# Patient Record
Sex: FEMALE | Race: OTHER | HISPANIC OR LATINO
[De-identification: names, ages, dates, MRNs, and addresses within clinical notes are randomized per-mention and may not be internally consistent; named-entity substitution may affect disease eponyms.]

---

## 2018-06-27 ENCOUNTER — ASOB RESULT (OUTPATIENT)
Age: 38
End: 2018-06-27

## 2018-06-27 ENCOUNTER — APPOINTMENT (OUTPATIENT)
Dept: ANTEPARTUM | Facility: CLINIC | Age: 38
End: 2018-06-27
Payer: COMMERCIAL

## 2018-06-27 PROBLEM — Z00.00 ENCOUNTER FOR PREVENTIVE HEALTH EXAMINATION: Status: ACTIVE | Noted: 2018-06-27

## 2018-06-27 PROCEDURE — 76816 OB US FOLLOW-UP PER FETUS: CPT

## 2018-07-17 ENCOUNTER — APPOINTMENT (OUTPATIENT)
Dept: MATERNAL FETAL MEDICINE | Facility: CLINIC | Age: 38
End: 2018-07-17
Payer: COMMERCIAL

## 2018-07-17 ENCOUNTER — ASOB RESULT (OUTPATIENT)
Age: 38
End: 2018-07-17

## 2018-07-17 ENCOUNTER — APPOINTMENT (OUTPATIENT)
Dept: ANTEPARTUM | Facility: CLINIC | Age: 38
End: 2018-07-17

## 2018-07-17 DIAGNOSIS — O35.1XX0 MATERNAL CARE FOR (SUSPECTED) CHROMOSOMAL ABNORMALITY IN FETUS, NOT APPLICABLE OR UNSPECIFIED: ICD-10-CM

## 2018-07-17 DIAGNOSIS — O09.529 SUPERVISION OF ELDERLY MULTIGRAVIDA, UNSPECIFIED TRIMESTER: ICD-10-CM

## 2018-07-17 PROCEDURE — 99241 OFFICE CONSULTATION NEW/ESTAB PATIENT 15 MIN: CPT | Mod: 25

## 2018-07-19 ENCOUNTER — EMERGENCY (EMERGENCY)
Facility: HOSPITAL | Age: 38
LOS: 1 days | Discharge: HOME | End: 2018-07-19
Attending: EMERGENCY MEDICINE
Payer: COMMERCIAL

## 2018-07-19 VITALS
OXYGEN SATURATION: 98 % | SYSTOLIC BLOOD PRESSURE: 115 MMHG | RESPIRATION RATE: 16 BRPM | TEMPERATURE: 98 F | HEART RATE: 80 BPM | DIASTOLIC BLOOD PRESSURE: 72 MMHG

## 2018-07-19 VITALS — WEIGHT: 149.03 LBS

## 2018-07-19 LAB
APPEARANCE UR: CLEAR — SIGNIFICANT CHANGE UP
BACTERIA # UR AUTO: ABNORMAL
BILIRUB UR-MCNC: NEGATIVE — SIGNIFICANT CHANGE UP
COLOR SPEC: YELLOW — SIGNIFICANT CHANGE UP
DIFF PNL FLD: NEGATIVE — SIGNIFICANT CHANGE UP
EPI CELLS # UR: SIGNIFICANT CHANGE UP
GLUCOSE UR QL: 50 MG/DL
HCG UR QL: POSITIVE
KETONES UR-MCNC: ABNORMAL
LEUKOCYTE ESTERASE UR-ACNC: NEGATIVE — SIGNIFICANT CHANGE UP
NITRITE UR-MCNC: NEGATIVE — SIGNIFICANT CHANGE UP
PH UR: 6 — SIGNIFICANT CHANGE UP (ref 5–8)
PROT UR-MCNC: 30 MG/DL
RBC CASTS # UR COMP ASSIST: SIGNIFICANT CHANGE UP /HPF (ref 0–4)
SP GR SPEC: 1.02 — SIGNIFICANT CHANGE UP (ref 1.01–1.02)
UROBILINOGEN FLD QL: 1 MG/DL
WBC UR QL: SIGNIFICANT CHANGE UP

## 2018-07-19 PROCEDURE — 99284 EMERGENCY DEPT VISIT MOD MDM: CPT

## 2018-07-19 PROCEDURE — 76815 OB US LIMITED FETUS(S): CPT | Mod: 26

## 2018-07-19 PROCEDURE — T1013: CPT

## 2018-07-19 PROCEDURE — 76810 OB US >/= 14 WKS ADDL FETUS: CPT

## 2018-07-19 PROCEDURE — 81025 URINE PREGNANCY TEST: CPT

## 2018-07-19 PROCEDURE — 81001 URINALYSIS AUTO W/SCOPE: CPT

## 2018-07-19 NOTE — ED PROVIDER NOTE - CARE PLAN
Principal Discharge DX:	Abdominal pain during intrauterine pregnancy  Secondary Diagnosis:	IUP (intrauterine pregnancy), incidental

## 2018-07-19 NOTE — ED PROVIDER NOTE - OBJECTIVE STATEMENT
18 week pregnant female pt c/o RLQ pain x 1 day, currently resolved. Is concerned as she has h/o symptomless miscarriage in past. Denies vaginal bleeding or discharge, loss of fluids, cramping. States she is feeling the baby move "lightly". Denies nausea/vomiting. Pt presented to urgent care and was sent here for sonogram. Pt denies current pain or tenderness.

## 2018-07-19 NOTE — ED PROVIDER NOTE - MEDICAL DECISION MAKING DETAILS
bedside ultrasound shows good fetal movement and fetal heart beat. Will obtain official transvaginal ultrasound and UA and reassess.

## 2018-07-19 NOTE — ED PROVIDER NOTE - NONTENDER LOCATION
left upper quadrant/left lower quadrant/umbilical/suprapubic/left costovertebral angle/right lower quadrant/periumbilical/right costovertebral angle/right upper quadrant

## 2018-07-19 NOTE — ED ADULT NURSE NOTE - OBJECTIVE STATEMENT
Pt a&ox3 BIBA c/o abd pain x1day, reports is currently 18x weeks PG with hx of miscarriages. Denies bleeding denies n/v/d denies cramping. +prenatal care, MAEx4, RR even and unlabored, able to ambulate with steady gait noted. In no apparent distress @ this time. Updated on POC, pending US

## 2018-07-25 LAB
2ND TRIMESTER DATA: NORMAL
AFP PNL SERPL: NORMAL
AFP SERPL-ACNC: NORMAL
B-HCG FREE SERPL-MCNC: NORMAL
CLINICAL BIOCHEMIST REVIEW: NORMAL
INHIBIN A SERPL-MCNC: NORMAL
NOTES NTD: NORMAL
U ESTRIOL SERPL-SCNC: NORMAL

## 2018-07-28 ENCOUNTER — APPOINTMENT (OUTPATIENT)
Dept: ANTEPARTUM | Facility: CLINIC | Age: 38
End: 2018-07-28
Payer: COMMERCIAL

## 2018-07-28 ENCOUNTER — ASOB RESULT (OUTPATIENT)
Age: 38
End: 2018-07-28

## 2018-07-28 PROCEDURE — 76811 OB US DETAILED SNGL FETUS: CPT

## 2018-07-28 PROCEDURE — 76817 TRANSVAGINAL US OBSTETRIC: CPT

## 2018-07-31 ENCOUNTER — APPOINTMENT (OUTPATIENT)
Dept: MATERNAL FETAL MEDICINE | Facility: CLINIC | Age: 38
End: 2018-07-31
Payer: COMMERCIAL

## 2018-07-31 ENCOUNTER — ASOB RESULT (OUTPATIENT)
Age: 38
End: 2018-07-31

## 2018-07-31 PROCEDURE — 99241 OFFICE CONSULTATION NEW/ESTAB PATIENT 15 MIN: CPT | Mod: 25

## 2018-10-30 PROBLEM — O09.299 SUPERVISION OF PREGNANCY WITH OTHER POOR REPRODUCTIVE OR OBSTETRIC HISTORY, UNSPECIFIED TRIMESTER: Chronic | Status: ACTIVE | Noted: 2018-07-19

## 2018-10-31 ENCOUNTER — ASOB RESULT (OUTPATIENT)
Age: 38
End: 2018-10-31

## 2018-10-31 ENCOUNTER — APPOINTMENT (OUTPATIENT)
Dept: ANTEPARTUM | Facility: CLINIC | Age: 38
End: 2018-10-31
Payer: COMMERCIAL

## 2018-10-31 PROCEDURE — 76819 FETAL BIOPHYS PROFIL W/O NST: CPT

## 2018-10-31 PROCEDURE — 76816 OB US FOLLOW-UP PER FETUS: CPT

## 2018-12-03 ENCOUNTER — APPOINTMENT (OUTPATIENT)
Dept: ANTEPARTUM | Facility: CLINIC | Age: 38
End: 2018-12-03
Payer: COMMERCIAL

## 2018-12-03 ENCOUNTER — ASOB RESULT (OUTPATIENT)
Age: 38
End: 2018-12-03

## 2018-12-03 PROCEDURE — 76816 OB US FOLLOW-UP PER FETUS: CPT

## 2018-12-20 ENCOUNTER — INPATIENT (INPATIENT)
Facility: HOSPITAL | Age: 38
LOS: 1 days | Discharge: HOME | End: 2018-12-22
Attending: OBSTETRICS & GYNECOLOGY | Admitting: OBSTETRICS & GYNECOLOGY
Payer: COMMERCIAL

## 2018-12-20 VITALS — DIASTOLIC BLOOD PRESSURE: 63 MMHG | HEART RATE: 80 BPM | SYSTOLIC BLOOD PRESSURE: 117 MMHG

## 2018-12-20 DIAGNOSIS — O47.1 FALSE LABOR AT OR AFTER 37 COMPLETED WEEKS OF GESTATION: ICD-10-CM

## 2018-12-20 DIAGNOSIS — O26.893 OTHER SPECIFIED PREGNANCY RELATED CONDITIONS, THIRD TRIMESTER: ICD-10-CM

## 2018-12-20 LAB
APPEARANCE UR: ABNORMAL
BACTERIA # UR AUTO: ABNORMAL
BASOPHILS # BLD AUTO: 0 K/UL — SIGNIFICANT CHANGE UP (ref 0–0.2)
BASOPHILS NFR BLD AUTO: 0.3 % — SIGNIFICANT CHANGE UP (ref 0–2)
BILIRUB UR-MCNC: NEGATIVE — SIGNIFICANT CHANGE UP
BLD GP AB SCN SERPL QL: SIGNIFICANT CHANGE UP
COLOR SPEC: YELLOW — SIGNIFICANT CHANGE UP
DIFF PNL FLD: ABNORMAL
EOSINOPHIL # BLD AUTO: 0.1 K/UL — SIGNIFICANT CHANGE UP (ref 0–0.5)
EOSINOPHIL NFR BLD AUTO: 1.2 % — SIGNIFICANT CHANGE UP (ref 0–6)
EPI CELLS # UR: ABNORMAL
GLUCOSE UR QL: 50 MG/DL
HCT VFR BLD CALC: 37.5 % — SIGNIFICANT CHANGE UP (ref 37–47)
HGB BLD-MCNC: 12.6 G/DL — SIGNIFICANT CHANGE UP (ref 12–16)
KETONES UR-MCNC: NEGATIVE — SIGNIFICANT CHANGE UP
LEUKOCYTE ESTERASE UR-ACNC: ABNORMAL
LYMPHOCYTES # BLD AUTO: 1.9 K/UL — SIGNIFICANT CHANGE UP (ref 1–4.8)
LYMPHOCYTES # BLD AUTO: 24.4 % — SIGNIFICANT CHANGE UP (ref 20–55)
MCHC RBC-ENTMCNC: 29.9 PG — SIGNIFICANT CHANGE UP (ref 27–31)
MCHC RBC-ENTMCNC: 33.6 G/DL — SIGNIFICANT CHANGE UP (ref 32–36)
MCV RBC AUTO: 88.9 FL — SIGNIFICANT CHANGE UP (ref 81–99)
MONOCYTES # BLD AUTO: 0.4 K/UL — SIGNIFICANT CHANGE UP (ref 0–0.8)
MONOCYTES NFR BLD AUTO: 5.2 % — SIGNIFICANT CHANGE UP (ref 3–10)
NEUTROPHILS # BLD AUTO: 5.3 K/UL — SIGNIFICANT CHANGE UP (ref 1.8–8)
NEUTROPHILS NFR BLD AUTO: 68.5 % — SIGNIFICANT CHANGE UP (ref 37–73)
NITRITE UR-MCNC: POSITIVE
PH UR: 6 — SIGNIFICANT CHANGE UP (ref 5–8)
PLATELET # BLD AUTO: 254 K/UL — SIGNIFICANT CHANGE UP (ref 150–400)
PROT UR-MCNC: 30 MG/DL
RBC # BLD: 4.22 M/UL — LOW (ref 4.4–5.2)
RBC # FLD: 15.4 % — SIGNIFICANT CHANGE UP (ref 11–15.6)
RBC CASTS # UR COMP ASSIST: ABNORMAL /HPF (ref 0–4)
SP GR SPEC: 1.01 — SIGNIFICANT CHANGE UP (ref 1.01–1.02)
UROBILINOGEN FLD QL: NEGATIVE MG/DL — SIGNIFICANT CHANGE UP
WBC # BLD: 7.7 K/UL — SIGNIFICANT CHANGE UP (ref 4.8–10.8)
WBC # FLD AUTO: 7.7 K/UL — SIGNIFICANT CHANGE UP (ref 4.8–10.8)
WBC UR QL: ABNORMAL

## 2018-12-20 RX ORDER — TETANUS TOXOID, REDUCED DIPHTHERIA TOXOID AND ACELLULAR PERTUSSIS VACCINE, ADSORBED 5; 2.5; 8; 8; 2.5 [IU]/.5ML; [IU]/.5ML; UG/.5ML; UG/.5ML; UG/.5ML
0.5 SUSPENSION INTRAMUSCULAR ONCE
Qty: 0 | Refills: 0 | Status: DISCONTINUED | OUTPATIENT
Start: 2018-12-20 | End: 2018-12-22

## 2018-12-20 RX ORDER — SODIUM CHLORIDE 9 MG/ML
1000 INJECTION, SOLUTION INTRAVENOUS ONCE
Qty: 0 | Refills: 0 | Status: DISCONTINUED | OUTPATIENT
Start: 2018-12-20 | End: 2018-12-20

## 2018-12-20 RX ORDER — SODIUM CHLORIDE 9 MG/ML
1000 INJECTION, SOLUTION INTRAVENOUS
Qty: 0 | Refills: 0 | Status: DISCONTINUED | OUTPATIENT
Start: 2018-12-20 | End: 2018-12-20

## 2018-12-20 RX ORDER — SODIUM CHLORIDE 9 MG/ML
3 INJECTION INTRAMUSCULAR; INTRAVENOUS; SUBCUTANEOUS EVERY 8 HOURS
Qty: 0 | Refills: 0 | Status: DISCONTINUED | OUTPATIENT
Start: 2018-12-20 | End: 2018-12-22

## 2018-12-20 RX ORDER — LANOLIN
1 OINTMENT (GRAM) TOPICAL EVERY 6 HOURS
Qty: 0 | Refills: 0 | Status: DISCONTINUED | OUTPATIENT
Start: 2018-12-20 | End: 2018-12-22

## 2018-12-20 RX ORDER — IBUPROFEN 200 MG
600 TABLET ORAL EVERY 6 HOURS
Qty: 0 | Refills: 0 | Status: DISCONTINUED | OUTPATIENT
Start: 2018-12-20 | End: 2018-12-22

## 2018-12-20 RX ORDER — ACETAMINOPHEN 500 MG
650 TABLET ORAL EVERY 6 HOURS
Qty: 0 | Refills: 0 | Status: DISCONTINUED | OUTPATIENT
Start: 2018-12-20 | End: 2018-12-22

## 2018-12-20 RX ORDER — DIPHENHYDRAMINE HCL 50 MG
25 CAPSULE ORAL EVERY 6 HOURS
Qty: 0 | Refills: 0 | Status: DISCONTINUED | OUTPATIENT
Start: 2018-12-20 | End: 2018-12-22

## 2018-12-20 RX ORDER — DOCUSATE SODIUM 100 MG
100 CAPSULE ORAL
Qty: 0 | Refills: 0 | Status: DISCONTINUED | OUTPATIENT
Start: 2018-12-20 | End: 2018-12-22

## 2018-12-20 RX ORDER — GLYCERIN ADULT
1 SUPPOSITORY, RECTAL RECTAL AT BEDTIME
Qty: 0 | Refills: 0 | Status: DISCONTINUED | OUTPATIENT
Start: 2018-12-20 | End: 2018-12-22

## 2018-12-20 RX ORDER — OXYTOCIN 10 UNIT/ML
41.67 VIAL (ML) INJECTION
Qty: 20 | Refills: 0 | Status: DISCONTINUED | OUTPATIENT
Start: 2018-12-20 | End: 2018-12-22

## 2018-12-20 RX ORDER — AER TRAVELER 0.5 G/1
1 SOLUTION RECTAL; TOPICAL EVERY 4 HOURS
Qty: 0 | Refills: 0 | Status: DISCONTINUED | OUTPATIENT
Start: 2018-12-20 | End: 2018-12-22

## 2018-12-20 RX ORDER — PRAMOXINE HYDROCHLORIDE 150 MG/15G
1 AEROSOL, FOAM RECTAL EVERY 4 HOURS
Qty: 0 | Refills: 0 | Status: DISCONTINUED | OUTPATIENT
Start: 2018-12-20 | End: 2018-12-22

## 2018-12-20 RX ORDER — DIBUCAINE 1 %
1 OINTMENT (GRAM) RECTAL EVERY 4 HOURS
Qty: 0 | Refills: 0 | Status: DISCONTINUED | OUTPATIENT
Start: 2018-12-20 | End: 2018-12-22

## 2018-12-20 RX ORDER — OXYTOCIN 10 UNIT/ML
333.33 VIAL (ML) INJECTION
Qty: 20 | Refills: 0 | Status: DISCONTINUED | OUTPATIENT
Start: 2018-12-20 | End: 2018-12-20

## 2018-12-20 RX ORDER — MAGNESIUM HYDROXIDE 400 MG/1
30 TABLET, CHEWABLE ORAL
Qty: 0 | Refills: 0 | Status: DISCONTINUED | OUTPATIENT
Start: 2018-12-20 | End: 2018-12-22

## 2018-12-20 RX ORDER — HYDROCORTISONE 1 %
1 OINTMENT (GRAM) TOPICAL EVERY 4 HOURS
Qty: 0 | Refills: 0 | Status: DISCONTINUED | OUTPATIENT
Start: 2018-12-20 | End: 2018-12-22

## 2018-12-20 RX ORDER — SIMETHICONE 80 MG/1
80 TABLET, CHEWABLE ORAL EVERY 6 HOURS
Qty: 0 | Refills: 0 | Status: DISCONTINUED | OUTPATIENT
Start: 2018-12-20 | End: 2018-12-22

## 2018-12-20 RX ORDER — OXYTOCIN 10 UNIT/ML
2 VIAL (ML) INJECTION
Qty: 30 | Refills: 0 | Status: DISCONTINUED | OUTPATIENT
Start: 2018-12-20 | End: 2018-12-22

## 2018-12-20 RX ORDER — OXYCODONE AND ACETAMINOPHEN 5; 325 MG/1; MG/1
2 TABLET ORAL EVERY 6 HOURS
Qty: 0 | Refills: 0 | Status: DISCONTINUED | OUTPATIENT
Start: 2018-12-20 | End: 2018-12-22

## 2018-12-20 RX ORDER — CITRIC ACID/SODIUM CITRATE 300-500 MG
30 SOLUTION, ORAL ORAL ONCE
Qty: 0 | Refills: 0 | Status: DISCONTINUED | OUTPATIENT
Start: 2018-12-20 | End: 2018-12-20

## 2018-12-20 RX ADMIN — Medication 2 MILLIUNIT(S)/MIN: at 15:34

## 2018-12-20 RX ADMIN — SODIUM CHLORIDE 125 MILLILITER(S): 9 INJECTION, SOLUTION INTRAVENOUS at 15:32

## 2018-12-20 NOTE — OB PROVIDER H&P - NSHPPHYSICALEXAM_GEN_ALL_CORE
Vital Signs Last 24 Hrs  T(C): 36.7 (20 Dec 2018 13:24), Max: 36.7 (20 Dec 2018 13:24)  T(F): 98.1 (20 Dec 2018 13:24), Max: 98.1 (20 Dec 2018 13:24)  HR: 80 (20 Dec 2018 13:24) (80 - 80)  BP: 117/63 (20 Dec 2018 13:24) (117/63 - 117/63)  RR: 18 (20 Dec 2018 13:24) (18 - 18)    Gen: lying in bed, NAD  Abd: soft, gravid, NT  FHR: 145 bpm, + accels, mod variability, cat 1  [place]: irregular contractions q5-15 min

## 2018-12-20 NOTE — OB PROVIDER DELIVERY SUMMARY - NSPROVIDERDELIVERYNOTE_OBGYN_ALL_OB_FT
Patient progressed to fully dilation. Delivered viable female, no nuchal cord noted. Shoulders delivered without difficulty. Infant placed in mother's abdomen. Cord clamped and cut. Cord blood gases and cord blood collected. Placenta delivered spontaneously and intact. Bilateral labial laceration and 2nd degree perineal laceration repaired with good hemostasis and cosmesis noted. Mother and infant in room in stable condition. Apgars 9/9. lbs. Patient progressed to fully dilation. Delivered viable female, no nuchal cord noted. Shoulders delivered without difficulty. Infant placed in mother's abdomen. Cord clamped and cut. Cord blood gases and cord blood collected. Placenta delivered spontaneously and intact. Bilateral labial laceration and 2nd degree perineal laceration repaired with good hemostasis and cosmesis noted. Mother and infant in room in stable condition. Apgars 9/9. 3400g.

## 2018-12-20 NOTE — CHART NOTE - NSCHARTNOTEFT_GEN_A_CORE
S: Pt seen and examined at bed side. S/p epidural, comfortable.    O: Vital Signs Last 24 Hrs  T(C): 36.7 (20 Dec 2018 13:24), Max: 36.7 (20 Dec 2018 13:24)  T(F): 98.1 (20 Dec 2018 13:24), Max: 98.1 (20 Dec 2018 13:24)  HR: 68 (20 Dec 2018 17:20) (61 - 88)  BP: 118/77 (20 Dec 2018 17:20) (107/67 - 135/78)  RR: 18 (20 Dec 2018 13:24) (18 - 18)    VE: 5/90/-1 s/p AROM, clear  FHRT: 130 BMP, min variability, no decels.    Loving: Contractions q 2-4min    A/P: s/p epidural and AROM.   - progressing well  - continue Pit   - continue monitoring.

## 2018-12-20 NOTE — OB PROVIDER IHI INDUCTION/AUGMENTATION NOTE - NS_CHECKALL_OBGYN_ALL_OB
Induction / Augmentation was discussed/Contractions pattern was reviewed/Order was written/FHR was reviewed/H&P was completed

## 2018-12-20 NOTE — OB PROVIDER H&P - HISTORY OF PRESENT ILLNESS
38 y.o  at 40.1 wks presents due to contractions every 10 minutes since yesterday admitted for labor. Pt states that she has been having irregular contractions for about 2 weeks but recently became constant every 10 minutes. Yesterday she had loss of mucous plug.   +FM. Pt denies any vaginal bleeding, loss of fluid, dysuria, N/V, HA, fever, chills.

## 2018-12-20 NOTE — OB PROVIDER H&P - ASSESSMENT
38 y.o  at 40.1 wks presents due to contractions every 10 minutes since yesterday admitted for labor.   - expectant management at this time  - will consider starting pitocin if patient not nehemias  - routine lab work  - will hydrate, patient requesting epidural.   - continue to monitor toco/fhr    Demarco Norman PGY-1  D/w Dr. Zimmerman

## 2018-12-20 NOTE — OB RN PATIENT PROFILE - WEIGHT: TOTAL WEIGHT IN LBS
,DirectAddress_Unknown,DirectAddress_Unknown,DirectAddress_Unknown,DirectAddress_Unknown 44 ,DirectAddress_Unknown,DirectAddress_Unknown,DirectAddress_Unknown,[email or web address]

## 2018-12-21 ENCOUNTER — TRANSCRIPTION ENCOUNTER (OUTPATIENT)
Age: 38
End: 2018-12-21

## 2018-12-21 LAB
BASOPHILS # BLD AUTO: 0 K/UL — SIGNIFICANT CHANGE UP (ref 0–0.2)
BASOPHILS NFR BLD AUTO: 0.1 % — SIGNIFICANT CHANGE UP (ref 0–2)
EOSINOPHIL # BLD AUTO: 0.1 K/UL — SIGNIFICANT CHANGE UP (ref 0–0.5)
EOSINOPHIL NFR BLD AUTO: 0.5 % — SIGNIFICANT CHANGE UP (ref 0–6)
HCT VFR BLD CALC: 33 % — LOW (ref 37–47)
HGB BLD-MCNC: 11 G/DL — LOW (ref 12–16)
LYMPHOCYTES # BLD AUTO: 13.3 % — LOW (ref 20–55)
LYMPHOCYTES # BLD AUTO: 2.1 K/UL — SIGNIFICANT CHANGE UP (ref 1–4.8)
MCHC RBC-ENTMCNC: 29.5 PG — SIGNIFICANT CHANGE UP (ref 27–31)
MCHC RBC-ENTMCNC: 33.3 G/DL — SIGNIFICANT CHANGE UP (ref 32–36)
MCV RBC AUTO: 88.5 FL — SIGNIFICANT CHANGE UP (ref 81–99)
MONOCYTES # BLD AUTO: 0.7 K/UL — SIGNIFICANT CHANGE UP (ref 0–0.8)
MONOCYTES NFR BLD AUTO: 4.8 % — SIGNIFICANT CHANGE UP (ref 3–10)
NEUTROPHILS # BLD AUTO: 12.6 K/UL — HIGH (ref 1.8–8)
NEUTROPHILS NFR BLD AUTO: 80.9 % — HIGH (ref 37–73)
PLATELET # BLD AUTO: 204 K/UL — SIGNIFICANT CHANGE UP (ref 150–400)
RBC # BLD: 3.73 M/UL — LOW (ref 4.4–5.2)
RBC # FLD: 15.4 % — SIGNIFICANT CHANGE UP (ref 11–15.6)
T PALLIDUM AB TITR SER: NEGATIVE — SIGNIFICANT CHANGE UP
WBC # BLD: 15.5 K/UL — HIGH (ref 4.8–10.8)
WBC # FLD AUTO: 15.5 K/UL — HIGH (ref 4.8–10.8)

## 2018-12-21 RX ORDER — OXYTOCIN 10 UNIT/ML
333.33 VIAL (ML) INJECTION
Qty: 20 | Refills: 0 | Status: DISCONTINUED | OUTPATIENT
Start: 2018-12-21 | End: 2018-12-22

## 2018-12-21 RX ADMIN — Medication 600 MILLIGRAM(S): at 04:10

## 2018-12-21 RX ADMIN — Medication 600 MILLIGRAM(S): at 03:14

## 2018-12-21 RX ADMIN — Medication 125 MILLIUNIT(S)/MIN: at 00:43

## 2018-12-21 RX ADMIN — Medication 600 MILLIGRAM(S): at 23:47

## 2018-12-21 RX ADMIN — Medication 600 MILLIGRAM(S): at 10:46

## 2018-12-21 RX ADMIN — Medication 600 MILLIGRAM(S): at 17:50

## 2018-12-21 RX ADMIN — Medication 600 MILLIGRAM(S): at 17:28

## 2018-12-21 RX ADMIN — Medication 1 TABLET(S): at 13:57

## 2018-12-21 RX ADMIN — Medication 600 MILLIGRAM(S): at 11:22

## 2018-12-21 NOTE — PROGRESS NOTE ADULT - ASSESSMENT
38 years old.  s/p  at 40wks 1 day gestation. Postpartum day # 1 without complications.    -Routine post partum care  -Pain control as needed  -Encouraged ambulation  -Encouraged breastfeeding every 2-3 hrs  -Plan to discharge on ppd#2 if no acute events

## 2018-12-21 NOTE — PROGRESS NOTE ADULT - SUBJECTIVE AND OBJECTIVE BOX
Postpartum progress note.  38 years old.  s/p  at 40wks 1 day gestation. Postpartum day # 1    Patient seen and examined at bedside, no acute overnight events.    Pain is well controlled.  Patient is ambulating, tolerating PO intake of regular diet, voiding, passing flatus.   Breast feeding only.   She reports mild vaginal bleeding.  Patient denies headache, nausea/vomiting, shortness of breath, fever, chills or calf pain.     Vital Signs: Vital Signs Last 24 Hrs  T(C): 36.8 (21 Dec 2018 00:08), Max: 36.8 (20 Dec 2018 21:01)  T(F): 98.3 (21 Dec 2018 00:08), Max: 98.3 (21 Dec 2018 00:08)  HR: 89 (21 Dec 2018 00:08) (61 - 96)  BP: 108/69 (21 Dec 2018 00:08) (107/67 - 135/78)  RR: 18 (21 Dec 2018 00:08) (16 - 18)  SpO2: 95% (21 Dec 2018 00:08) (95% - 95%)    Physical Examination:  General: NAD, comfortable  Breast: no tenderness or engorgement  Abdomen: soft, non-distended, minimally tender, fundus firm at level of umbilicus  Extremities: No cyanosis or edema. No calf tenderness. DP pulses 2+ b/l    Labs:                        12.6   7.7   )-----------( 254      ( 20 Dec 2018 15:26 )             37.5       Medication:  MEDICATIONS  (STANDING):  diphtheria/tetanus/pertussis (acellular) Vaccine (ADAcel) 0.5 milliLiter(s) IntraMuscular once  oxytocin Infusion 41.667 milliUNIT(s)/Min (125 mL/Hr) IV Continuous <Continuous>  oxytocin Infusion 2 milliUNIT(s)/Min (2 mL/Hr) IV Continuous <Continuous>  oxytocin Infusion 333.333 milliUNIT(s)/Min (1000 mL/Hr) IV Continuous <Continuous>  prenatal multivitamin 1 Tablet(s) Oral daily  sodium chloride 0.9% lock flush 3 milliLiter(s) IV Push every 8 hours    MEDICATIONS  (PRN):  acetaminophen   Tablet .. 650 milliGRAM(s) Oral every 6 hours PRN Temp greater or equal to 38.5C (101.3F), Mild Pain (1 - 3)  dibucaine 1% Ointment 1 Application(s) Topical every 4 hours PRN Perineal Discomfort  diphenhydrAMINE 25 milliGRAM(s) Oral every 6 hours PRN Itching  docusate sodium 100 milliGRAM(s) Oral two times a day PRN Stool Softening  glycerin Suppository - Adult 1 Suppository(s) Rectal at bedtime PRN Constipation  hydrocortisone 1% Cream 1 Application(s) Topical every 4 hours PRN Moderate to Severe Perineal Pain  ibuprofen  Tablet. 600 milliGRAM(s) Oral every 6 hours PRN Moderate Pain (4 - 6)  lanolin Ointment 1 Application(s) Topical every 6 hours PRN Sore Nipples  magnesium hydroxide Suspension 30 milliLiter(s) Oral two times a day PRN Constipation  oxyCODONE    5 mG/acetaminophen 325 mG 2 Tablet(s) Oral every 6 hours PRN Severe Pain (7 - 10)  pramoxine 1%/zinc 5% Cream 1 Application(s) Topical every 4 hours PRN Moderate to Severe Perineal Pain  simethicone 80 milliGRAM(s) Chew every 6 hours PRN Gas  witch hazel Pads 1 Application(s) Topical every 4 hours PRN Perineal Discomfort

## 2018-12-22 VITALS
RESPIRATION RATE: 18 BRPM | HEART RATE: 88 BPM | SYSTOLIC BLOOD PRESSURE: 91 MMHG | TEMPERATURE: 98 F | DIASTOLIC BLOOD PRESSURE: 48 MMHG

## 2018-12-22 RX ORDER — IBUPROFEN 200 MG
1 TABLET ORAL
Qty: 28 | Refills: 0 | OUTPATIENT
Start: 2018-12-22 | End: 2018-12-28

## 2018-12-22 RX ADMIN — Medication 600 MILLIGRAM(S): at 12:15

## 2018-12-22 RX ADMIN — Medication 600 MILLIGRAM(S): at 00:17

## 2018-12-22 RX ADMIN — Medication 1 TABLET(S): at 12:15

## 2018-12-22 NOTE — DISCHARGE NOTE OB - HOSPITAL COURSE
38 years old,  s/p  at 40wks 1 day gestation Postpartum day # 2. No complications during delivery or postpartum course. Pt noted to have b/l labial and 2nd degree perineal laceration that was repaired with good hemostasis and cosmesis. Patient is ambulating, voiding, tolerating PO intake and is having +flatus. Patient is stable and ready for discharge to home. Instructed to follow up at [facility] clinic in 4 weeks for postpartum check.

## 2018-12-22 NOTE — DISCHARGE NOTE OB - PROVIDER TOKENS
FREE:[LAST:[[facility]],PHONE:[(   )    -],FAX:[(   )    -],ADDRESS:[Address: [address] [place] [address]  [phone]]]

## 2018-12-22 NOTE — DISCHARGE NOTE OB - CARE PROVIDER_API CALL
[facility],   Address: [address] [place] [address]  [phone]  Phone: (   )    -  Fax: (   )    -

## 2018-12-22 NOTE — DISCHARGE NOTE OB - NS AS DC AMI YN
Call Type: Triage Call    Presenting Problem: Pt reports that she feels really chilled.  She  has body aches and pains and fatigue.  Her temp is 98.7.  She took  tylenol 30 minutes.  No sore throat.  Slight headache.  Triage Note:  Guideline Title: Flu-Like Symptoms  Recommended Disposition: See Provider within 24 hours  Original Inclination:  Override Disposition:  Intended Action:  Physician Contacted: No  New productive cough with thick colored sputum (other than clear or white sputum;  not postnasal drainage) ?  YES  Signs of dehydration ? NO  Severe breathing problems ? NO  Breathing problems ? NO  Sudden change in mental status ? NO  Choking sensation, cannot swallow own saliva with associated drooling and soft  muffled voice ? NO  Temperature of 101.5 F (38.6 C) or greater that has not responded to 24 hours of  home care measures ? NO  New onset of stiff neck causing pain with forward head movement, severe  generalized headache, fever, or altered mental status ? NO  Any temperature elevation in an individual with a weakened immune system OR a  frail elderly person ? NO  Flu-like symptoms associated with a cough and breathing that is becoming more  difficult ? NO  Evaluated by provider AND symptoms worsening when following recommended treatment  plan for 48 hours ? NO  New OR worsening flu-like illness AND in high risk group for complications ? NO  In high risk group for complications of influenza AND has questions/concerns ? NO  Flu-like illness that has not improved after 7 days of home care ? NO  Gradual onset of upper respiratory illness signs and symptoms(If there is any  doubt that symptoms may be an upper respiratory illness, use this guideline,  Flu-Like Symptoms ) ? NO  Being treated by a provider for a secondary infection AND no improvement in  symptoms, symptoms have worsened OR has new symptoms after following treatment  plan for the time specified by provider. ? NO  Severe headache not relieved  with 8 hours of home care ? NO  Physician Instructions:  Care Advice: Use a cool mist humidifier to moisten air.  Be sure to clean  according to 's instructions.  Limit or avoid exposure to irritants and allergens (e.g. air pollution,  smoke/smoking, chemicals, dust, pollen, pet dander, etc.)  Increase fluids to 8-12 eight oz (1.6 to 2.4 liters) glasses per day, half  of them to be water.  Soups, popsicles, fruit juices, non-caffeinated sodas  (unless restricting sodium intake), jello, broths, decaf teas, etc. are all  okay.  Warm fluids can be soothing.  Aspirin should not be used in anyone, 18 years of age and under, for  temperature control, pain, or aches when flu is a possibility.  CAUTIONS  If you can, stop smoking now and avoid all secondhand smoke.  HEALTH PROMOTION / MAINTENANCE  Coughing up mucus or phlegm helps to get rid of an infection.  A productive  cough should not be stopped.  A cough medicine with guaifenesin  (Robitussin, Mucinex) can help loosen the mucus.  Cough medicine with  dextromethorphan (DM) should be avoided.  Drinking lots of fluids can help  loosen the mucus too, especially warm fluids.  Rest as much as possible until symptoms improve.  Analgesic/Antipyretic Advice - NSAIDs: Consider aspirin, ibuprofen,  naproxen or ketoprofen for pain or fever as directed on label or by  pharmacist/provider. PRECAUTIONS: - You should not take this medicine for  more than 10 days unless recommended by your provider. EXCEPTIONS: - Should  not be used if taking blood thinners or have bleeding problems. - Do not  use if have history of sensitivity/allergy to any of these medications  or history of cardiovascular, ulcer, kidney, liver disease or diabetes  unless approved by provider. - Do not exceed recommended dose or frequency.  Influenza Respiratory Hygiene: - Cover the nose/mouth tightly with a tissue  when coughing or sneezing. - Use tissue one time and discard in the nearest  waste  receptacle. - Wash hands with soap and water or alcohol-based hand  rub for at least 20 seconds after coming into contact with respiratory  secretions and contaminated objects/materials. - When a tissue is not  available, cough into the bend of the elbow. - Avoid touching your eyes,  nose or mouth. - When ill wear a disposable face mask when around others,  especially around those at high risk for flu-related complications, to help  decrease the likelihood of infecting them.  Analgesic/Antipyretic Advice - Acetaminophen:  Consider acetaminophen as  directed on label or by pharmacist/provider for pain or fever.  PRECAUTIONS:  - Use only if there is no history of liver disease,  alcoholism, or intake of three or more alcohol drinks per day.  - If  approved by provider when breastfeeding. - Do not exceed recommended dose  or frequency. Do not take more than 3000 milligrams (mg) in 24 hours. Do  not take this medicine for more than 10 days unless recommended by your  provider. - To make sure you don't take too much, check other medicines you  take to see if they also contain acetaminophen.   no

## 2018-12-22 NOTE — DISCHARGE NOTE OB - MATERIALS PROVIDED
[facility]  Screening Program/Tdap Vaccination (VIS Pub Date: 2012)/Breastfeeding Mother’s Support Group Information/Guide to Postpartum Care/Back To Sleep Handout/Discharge Medication Information for Patients and Families Pocket Guide/  Immunization Record/Breastfeeding Log/Shaken Baby Prevention Handout/Birth Certificate Instructions/MMR Vaccination (VIS Pub Date: 2012)/[facility] Hearing Screen Program/Breastfeeding Guide and Packet/Vaccinations

## 2018-12-22 NOTE — DISCHARGE NOTE OB - PLAN OF CARE
delivered Please follow up at [facility] clinic in 4 weeks for postpartum check. Continue taking prenatal vitamins. Recommend breastfeeding every 2-3 hrs. Take pain medication as needed for mild-to-moderate pain.

## 2018-12-22 NOTE — DISCHARGE NOTE OB - CARE PLAN
Principal Discharge DX:	 (normal spontaneous vaginal delivery)  Goal:	delivered  Assessment and plan of treatment:	Please follow up at [facility] clinic in 4 weeks for postpartum check. Continue taking prenatal vitamins. Recommend breastfeeding every 2-3 hrs. Take pain medication as needed for mild-to-moderate pain.

## 2018-12-22 NOTE — DISCHARGE NOTE OB - MEDICATION SUMMARY - MEDICATIONS TO TAKE
I will START or STAY ON the medications listed below when I get home from the hospital:    ibuprofen 600 mg oral tablet  -- 1 tab(s) by mouth every 6 hours, As needed for Mild to Moderate Pain  -- Indication: For Pain    Prenatal Multivitamins with Folic Acid 1 mg oral tablet  -- 1 tab(s) by mouth once a day  -- Indication: For Supplement

## 2018-12-22 NOTE — DISCHARGE NOTE OB - PATIENT PORTAL LINK FT
You can access the [facility] Patient Portal, offered by [facility], by registering with the following website: [email or web address]

## 2018-12-22 NOTE — PROGRESS NOTE ADULT - SUBJECTIVE AND OBJECTIVE BOX
38 years old.  s/p  at 40wks 1 day gestation. Postpartum day # 2    Patient seen and examined at bedside, no acute overnight events.    Pain is well controlled.  Patient is ambulating, tolerating PO intake of regular diet, voiding, passing flatus and had a BM yesterday.   Breast feeding and formula.   She reports mild vaginal bleeding.  Patient denies headache, nausea/vomiting, shortness of breath, fever, chills or calf pain.     Vital Signs Last 24 Hrs  T(C): 36.8 (21 Dec 2018 20:26), Max: 36.9 (21 Dec 2018 09:48)  T(F): 98.2 (21 Dec 2018 20:26), Max: 98.5 (21 Dec 2018 09:48)  HR: 94 (21 Dec 2018 20:26) (92 - 94)  BP: 114/75 (21 Dec 2018 20:26) (113/74 - 114/75)  RR: 18 (21 Dec 2018 20:26) (18 - 18)    Physical Examination:  Gen: NAD, comfortable  Breast: no tenderness or engorgement  Abdomen: soft, non-distended, minimally tender, fundus firm at level of umbilicus  Extremities: No cyanosis or edema. No calf tenderness. DP pulses 2+ b/l    Labs:                        11.0   15.5  )-----------( 204      ( 21 Dec 2018 07:57 )             33.0       Medication:  MEDICATIONS  (STANDING):  diphtheria/tetanus/pertussis (acellular) Vaccine (ADAcel) 0.5 milliLiter(s) IntraMuscular once  oxytocin Infusion 41.667 milliUNIT(s)/Min (125 mL/Hr) IV Continuous <Continuous>  oxytocin Infusion 2 milliUNIT(s)/Min (2 mL/Hr) IV Continuous <Continuous>  oxytocin Infusion 333.333 milliUNIT(s)/Min (1000 mL/Hr) IV Continuous <Continuous>  prenatal multivitamin 1 Tablet(s) Oral daily  sodium chloride 0.9% lock flush 3 milliLiter(s) IV Push every 8 hours    MEDICATIONS  (PRN):  acetaminophen   Tablet .. 650 milliGRAM(s) Oral every 6 hours PRN Temp greater or equal to 38.5C (101.3F), Mild Pain (1 - 3)  dibucaine 1% Ointment 1 Application(s) Topical every 4 hours PRN Perineal Discomfort  diphenhydrAMINE 25 milliGRAM(s) Oral every 6 hours PRN Itching  docusate sodium 100 milliGRAM(s) Oral two times a day PRN Stool Softening  glycerin Suppository - Adult 1 Suppository(s) Rectal at bedtime PRN Constipation  hydrocortisone 1% Cream 1 Application(s) Topical every 4 hours PRN Moderate to Severe Perineal Pain  ibuprofen  Tablet. 600 milliGRAM(s) Oral every 6 hours PRN Moderate Pain (4 - 6)  lanolin Ointment 1 Application(s) Topical every 6 hours PRN Sore Nipples  magnesium hydroxide Suspension 30 milliLiter(s) Oral two times a day PRN Constipation  oxyCODONE    5 mG/acetaminophen 325 mG 2 Tablet(s) Oral every 6 hours PRN Severe Pain (7 - 10)  pramoxine 1%/zinc 5% Cream 1 Application(s) Topical every 4 hours PRN Moderate to Severe Perineal Pain  simethicone 80 milliGRAM(s) Chew every 6 hours PRN Gas  witch hazel Pads 1 Application(s) Topical every 4 hours PRN Perineal Discomfort

## 2018-12-22 NOTE — PROGRESS NOTE ADULT - ASSESSMENT
38 years old.  s/p  at 40wks 1 day gestation. Postpartum day # 2 without complications.    -Routine post partum care  -Pain control as needed  -Encouraged ambulation  -Encouraged breastfeeding every 2-3 hrs  -Discharge home today

## 2019-01-09 PROCEDURE — 85027 COMPLETE CBC AUTOMATED: CPT

## 2019-01-09 PROCEDURE — T1013: CPT

## 2019-01-09 PROCEDURE — 86780 TREPONEMA PALLIDUM: CPT

## 2019-01-09 PROCEDURE — 36415 COLL VENOUS BLD VENIPUNCTURE: CPT

## 2019-01-09 PROCEDURE — 86900 BLOOD TYPING SEROLOGIC ABO: CPT

## 2019-01-09 PROCEDURE — 86850 RBC ANTIBODY SCREEN: CPT

## 2019-01-09 PROCEDURE — 81001 URINALYSIS AUTO W/SCOPE: CPT

## 2019-01-09 PROCEDURE — 59050 FETAL MONITOR W/REPORT: CPT

## 2019-01-09 PROCEDURE — 86901 BLOOD TYPING SEROLOGIC RH(D): CPT

## 2019-01-09 PROCEDURE — G0463: CPT

## 2019-01-09 PROCEDURE — 59025 FETAL NON-STRESS TEST: CPT

## 2019-05-10 NOTE — ED ADULT NURSE NOTE - CAS DISCH TRANSFER METHOD
5/10: Phone call to Latisha Velasquez with Dr. Ventura's message. She verbalized understanding and is agreeable.     ----- Message -----  From: Diego Ventura MD  Sent: 5/10/2019   8:31 AM  To: Khushbu Ta RN    Please let her know CA-125 about the same, will proceed with next cycle 5/30 and review trend at next f/u 6/6  ----- Message -----  From: Khushbu Ta RN  Sent: 5/10/2019   7:16 AM  To: Diego Ventura MD MD visit       Private car

## 2019-09-03 ENCOUNTER — APPOINTMENT (OUTPATIENT)
Dept: DERMATOLOGY | Facility: CLINIC | Age: 39
End: 2019-09-03
Payer: COMMERCIAL

## 2019-09-03 PROCEDURE — 99202 OFFICE O/P NEW SF 15 MIN: CPT

## 2019-12-04 ENCOUNTER — APPOINTMENT (OUTPATIENT)
Dept: DERMATOLOGY | Facility: CLINIC | Age: 39
End: 2019-12-04
Payer: COMMERCIAL

## 2019-12-04 PROCEDURE — 99213 OFFICE O/P EST LOW 20 MIN: CPT

## 2020-04-29 ENCOUNTER — EMERGENCY (EMERGENCY)
Facility: HOSPITAL | Age: 40
LOS: 1 days | Discharge: HOME | End: 2020-04-29
Attending: EMERGENCY MEDICINE
Payer: COMMERCIAL

## 2020-04-29 VITALS
HEART RATE: 86 BPM | SYSTOLIC BLOOD PRESSURE: 146 MMHG | HEIGHT: 64.96 IN | DIASTOLIC BLOOD PRESSURE: 80 MMHG | TEMPERATURE: 98 F | RESPIRATION RATE: 18 BRPM | WEIGHT: 145.06 LBS | OXYGEN SATURATION: 98 %

## 2020-04-29 LAB
ALBUMIN SERPL ELPH-MCNC: 4.2 G/DL — SIGNIFICANT CHANGE UP (ref 3.3–5.2)
ALP SERPL-CCNC: 63 U/L — SIGNIFICANT CHANGE UP (ref 40–120)
ALT FLD-CCNC: 14 U/L — SIGNIFICANT CHANGE UP
ANION GAP SERPL CALC-SCNC: 16 MMOL/L — SIGNIFICANT CHANGE UP (ref 5–17)
APTT BLD: 33.1 SEC — SIGNIFICANT CHANGE UP (ref 27.5–36.3)
AST SERPL-CCNC: 18 U/L — SIGNIFICANT CHANGE UP
BILIRUB SERPL-MCNC: 0.6 MG/DL — SIGNIFICANT CHANGE UP (ref 0.4–2)
BUN SERPL-MCNC: 11 MG/DL — SIGNIFICANT CHANGE UP (ref 8–20)
CALCIUM SERPL-MCNC: 9 MG/DL — SIGNIFICANT CHANGE UP (ref 8.6–10.2)
CHLORIDE SERPL-SCNC: 99 MMOL/L — SIGNIFICANT CHANGE UP (ref 98–107)
CO2 SERPL-SCNC: 22 MMOL/L — SIGNIFICANT CHANGE UP (ref 22–29)
CREAT SERPL-MCNC: 0.81 MG/DL — SIGNIFICANT CHANGE UP (ref 0.5–1.3)
D DIMER BLD IA.RAPID-MCNC: <150 NG/ML DDU — SIGNIFICANT CHANGE UP
GLUCOSE SERPL-MCNC: 92 MG/DL — SIGNIFICANT CHANGE UP (ref 70–99)
HCG SERPL-ACNC: <4 MIU/ML — SIGNIFICANT CHANGE UP
HCT VFR BLD CALC: 37.7 % — SIGNIFICANT CHANGE UP (ref 34.5–45)
HGB BLD-MCNC: 12.7 G/DL — SIGNIFICANT CHANGE UP (ref 11.5–15.5)
INR BLD: 1 RATIO — SIGNIFICANT CHANGE UP (ref 0.88–1.16)
MCHC RBC-ENTMCNC: 28.7 PG — SIGNIFICANT CHANGE UP (ref 27–34)
MCHC RBC-ENTMCNC: 33.7 GM/DL — SIGNIFICANT CHANGE UP (ref 32–36)
MCV RBC AUTO: 85.3 FL — SIGNIFICANT CHANGE UP (ref 80–100)
PLATELET # BLD AUTO: 204 K/UL — SIGNIFICANT CHANGE UP (ref 150–400)
POTASSIUM SERPL-MCNC: 3.7 MMOL/L — SIGNIFICANT CHANGE UP (ref 3.5–5.3)
POTASSIUM SERPL-SCNC: 3.7 MMOL/L — SIGNIFICANT CHANGE UP (ref 3.5–5.3)
PROT SERPL-MCNC: 7.6 G/DL — SIGNIFICANT CHANGE UP (ref 6.6–8.7)
PROTHROM AB SERPL-ACNC: 11.3 SEC — SIGNIFICANT CHANGE UP (ref 10–12.9)
RBC # BLD: 4.42 M/UL — SIGNIFICANT CHANGE UP (ref 3.8–5.2)
RBC # FLD: 14.6 % — HIGH (ref 10.3–14.5)
SODIUM SERPL-SCNC: 137 MMOL/L — SIGNIFICANT CHANGE UP (ref 135–145)
TROPONIN T SERPL-MCNC: <0.01 NG/ML — SIGNIFICANT CHANGE UP (ref 0–0.06)
WBC # BLD: 7.64 K/UL — SIGNIFICANT CHANGE UP (ref 3.8–10.5)
WBC # FLD AUTO: 7.64 K/UL — SIGNIFICANT CHANGE UP (ref 3.8–10.5)

## 2020-04-29 PROCEDURE — 85379 FIBRIN DEGRADATION QUANT: CPT

## 2020-04-29 PROCEDURE — 99285 EMERGENCY DEPT VISIT HI MDM: CPT

## 2020-04-29 PROCEDURE — 85730 THROMBOPLASTIN TIME PARTIAL: CPT

## 2020-04-29 PROCEDURE — 99284 EMERGENCY DEPT VISIT MOD MDM: CPT | Mod: 25

## 2020-04-29 PROCEDURE — 84484 ASSAY OF TROPONIN QUANT: CPT

## 2020-04-29 PROCEDURE — 71045 X-RAY EXAM CHEST 1 VIEW: CPT

## 2020-04-29 PROCEDURE — T1013: CPT

## 2020-04-29 PROCEDURE — 85027 COMPLETE CBC AUTOMATED: CPT

## 2020-04-29 PROCEDURE — 71045 X-RAY EXAM CHEST 1 VIEW: CPT | Mod: 26

## 2020-04-29 PROCEDURE — 93005 ELECTROCARDIOGRAM TRACING: CPT

## 2020-04-29 PROCEDURE — 36415 COLL VENOUS BLD VENIPUNCTURE: CPT

## 2020-04-29 PROCEDURE — 84702 CHORIONIC GONADOTROPIN TEST: CPT

## 2020-04-29 PROCEDURE — 85610 PROTHROMBIN TIME: CPT

## 2020-04-29 PROCEDURE — 80053 COMPREHEN METABOLIC PANEL: CPT

## 2020-04-29 PROCEDURE — 93010 ELECTROCARDIOGRAM REPORT: CPT

## 2020-04-29 RX ORDER — IBUPROFEN 200 MG
1 TABLET ORAL
Qty: 16 | Refills: 0
Start: 2020-04-29 | End: 2020-05-02

## 2020-04-29 NOTE — ED PROVIDER NOTE - PROGRESS NOTE DETAILS
39 year old female presents with 2 weeks of chest pressure. HPI/ ROS/ PEx as stated above. Trop, D dimer, neg, EKG nl, CKG neg. Will d/c and advise pt to follow up with cardiologist. Return precautions provided.

## 2020-04-29 NOTE — ED PROVIDER NOTE - OBJECTIVE STATEMENT
40 y/o F with no PMHx now p/w chest pressure. Patient reports chest pain that radiates to the back with exertion, associated with SOB, palpitations, lightheadedness, c/o ROSS for 2-3 days. Reports having COVID symptoms during last week of march and first of April, fever and cough x 10 days that have resolved. Persistent chest pain and SOB that have worsened over the past 3 weeks.   Denies fever, allergies. LNMP: March 2020  SOCIAL: +social EtOH use, denies tobacco/illicit drug use

## 2020-04-29 NOTE — ED PROVIDER NOTE - PATIENT PORTAL LINK FT
You can access the FollowMyHealth Patient Portal offered by [facility] by registering at the following website: [email or web address]. By joining [employer] [employer]’s FollowMyHealth portal, you will also be able to view your health information using other applications (apps) compatible with our system.

## 2020-04-29 NOTE — ED PROVIDER NOTE - NS ED ROS FT
Constitutional: (-) fever  (-)chills  (-)sweats  Eyes/ENT: (-) blurry vision, (-) epistaxis  (-)rhinorrhea   (-) sore throat    Cardiovascular: (+) chest pain, (+) palpitations (-) edema   Respiratory: (-) cough, (+) shortness of breath (+) ROSS  Gastrointestinal: (-)nausea  (-)vomiting, (-) diarrhea  (-) abdominal pain   :  (-)dysuria, (-)frequency, (-)urgency, (-)hematuria  Musculoskeletal: (-) neck pain, (+) back pain, (-) joint pain  Integumentary: (-) rash, (-) edema  Neurological: (-) headache, (-) altered mental status  (-)LOC

## 2020-04-29 NOTE — ED ADULT TRIAGE NOTE - CHIEF COMPLAINT QUOTE
"I am feeling a chest pressure and like I can't breath I feel like I am have been running and when I take a deep breath the pain radiates laterally and it hurts. " Pt denies cardiac hx and states she had been around a positive COVID person but that was 3 weeks ago.  Pt A & OX4,

## 2020-04-29 NOTE — ED ADULT NURSE NOTE - NSIMPLEMENTINTERV_GEN_ALL_ED
Implemented All Fall with Harm Risk Interventions:  [place] to call system. Call bell, personal items and telephone within reach. Instruct patient to call for assistance. Room bathroom lighting operational. Non-slip footwear when patient is off stretcher. Physically safe environment: no spills, clutter or unnecessary equipment. Stretcher in lowest position, wheels locked, appropriate side rails in place. Provide visual cue, wrist band, yellow gown, etc. Monitor gait and stability. Monitor for mental status changes and reorient to person, place, and time. Review medications for side effects contributing to fall risk. Reinforce activity limits and safety measures with patient and family. Provide visual clues: red socks.

## 2020-04-29 NOTE — ED PROVIDER NOTE - NSFOLLOWUPINSTRUCTIONS_ED_ALL_ED_FT
-Sigue con cardiólogo    Dolor en el pecho    El dolor en el pecho puede ser causado por muchas condiciones diferentes que pueden o no ser peligrosas. Las causas incluyen acidez estomacal, infecciones pulmonares, ataque cardíaco, coágulo de guerline en los pulmones, infecciones de la piel, tensión o daño en los músculos, cartílagos o huesos, etc. Además de boyd historia clínica y un examen físico, un electrocardiograma (ECG) u otras pruebas de laboratorio pueden se qiu realizado para determinar la causa de marti dolor en el pecho. Pily un seguimiento con marti proveedor de atención primaria o con un cardiólogo según las instrucciones.    BUSQUE ATENCIÓN MÉDICA INMEDIATA SI TIENE ALGUNO DE LOS SÍNTOMAS SIGUIENTES: empeoramiento del dolor en el pecho, tos con guerline, dolor inexplicable de espalda / shea / mandíbula, dolor abdominal intenso, mareos o aturdimiento, desmayos, falta de aliento, piel sudorosa o húmeda, vómitos, o latidos acelerados del corazón. Estos síntomas pueden representar un problema grave que es boyd emergencia. No espere para pepe si los síntomas desaparecerán. Obtenga ayuda médica de inmediato. Llame al 911 y no conduzca hasta el hospital.

## 2020-04-29 NOTE — ED ADULT NURSE NOTE - OBJECTIVE STATEMENT
pt presents to the ed a&ox3 c/o chest pain/pressure radiating to her back. Denies cough, ha/dizziness. Pt also c/o sob upon rest, pt states +COVID contact.

## 2020-05-11 ENCOUNTER — APPOINTMENT (OUTPATIENT)
Dept: DERMATOLOGY | Facility: CLINIC | Age: 40
End: 2020-05-11
Payer: COMMERCIAL

## 2020-05-11 PROCEDURE — 99213 OFFICE O/P EST LOW 20 MIN: CPT | Mod: 95

## 2020-05-18 ENCOUNTER — APPOINTMENT (OUTPATIENT)
Dept: DERMATOLOGY | Facility: CLINIC | Age: 40
End: 2020-05-18

## 2020-07-29 ENCOUNTER — APPOINTMENT (OUTPATIENT)
Dept: ORTHOPEDIC SURGERY | Facility: CLINIC | Age: 40
End: 2020-07-29

## 2020-08-05 ENCOUNTER — APPOINTMENT (OUTPATIENT)
Dept: ORTHOPEDIC SURGERY | Facility: CLINIC | Age: 40
End: 2020-08-05

## 2020-08-19 ENCOUNTER — APPOINTMENT (OUTPATIENT)
Dept: DERMATOLOGY | Facility: CLINIC | Age: 40
End: 2020-08-19
Payer: COMMERCIAL

## 2020-08-19 PROCEDURE — 99213 OFFICE O/P EST LOW 20 MIN: CPT

## 2021-02-24 ENCOUNTER — ASOB RESULT (OUTPATIENT)
Age: 41
End: 2021-02-24

## 2021-02-24 ENCOUNTER — APPOINTMENT (OUTPATIENT)
Dept: ANTEPARTUM | Facility: CLINIC | Age: 41
End: 2021-02-24
Payer: COMMERCIAL

## 2021-02-24 PROCEDURE — 76830 TRANSVAGINAL US NON-OB: CPT

## 2021-02-24 PROCEDURE — 76857 US EXAM PELVIC LIMITED: CPT | Mod: 59

## 2021-02-24 PROCEDURE — 99072 ADDL SUPL MATRL&STAF TM PHE: CPT

## 2021-06-02 NOTE — OB RN DELIVERY SUMMARY - NS_CORDBLDGASA_OBGYN_ALL_OB
Lupe sent a fax stating:      Drug not covered by pt plan: ciprofloxacin-hydrocortisone (CIPRO HC) 0.2-1 % otic suspension    The preferred alternative is:    Ofloxacin  ciprofloxacinHCL  Neomycinpolymyxinhc. Pharmacy request a new prescription to be sent with preferred medication.     Please advise Both arterial and venous

## 2021-07-08 NOTE — OB RN DELIVERY SUMMARY - BABY A: ID BAND NUMBER, DELIVERY
[id number] [facility] Eucrisa Counseling: Patient may experience a mild burning sensation during topical application. Eucrisa is not approved in children less than 2 years of age.

## 2021-08-06 ENCOUNTER — APPOINTMENT (OUTPATIENT)
Dept: COLORECTAL SURGERY | Facility: CLINIC | Age: 41
End: 2021-08-06
Payer: COMMERCIAL

## 2021-08-06 VITALS
DIASTOLIC BLOOD PRESSURE: 70 MMHG | SYSTOLIC BLOOD PRESSURE: 103 MMHG | HEIGHT: 62.99 IN | WEIGHT: 148.04 LBS | OXYGEN SATURATION: 98 % | HEART RATE: 85 BPM | BODY MASS INDEX: 26.23 KG/M2

## 2021-08-06 DIAGNOSIS — Z78.9 OTHER SPECIFIED HEALTH STATUS: ICD-10-CM

## 2021-08-06 DIAGNOSIS — L29.0 PRURITUS ANI: ICD-10-CM

## 2021-08-06 PROCEDURE — 99203 OFFICE O/P NEW LOW 30 MIN: CPT | Mod: 25

## 2021-08-06 PROCEDURE — 46600 DIAGNOSTIC ANOSCOPY SPX: CPT

## 2021-08-06 NOTE — HISTORY OF PRESENT ILLNESS
[FreeTextEntry1] : 40-year-old female who presents for consultation: Hemorrhoids and perianal itching.  She reports a 2-month history of severe itching in the perianal region.  There is no associated bleeding or pain.  She has been having regular bowel movements without difficulty.  She does have a palpable hemorrhoid in the area but states this has been present for several years and has not really caused symptoms.  She has tried topical hemorrhoid creams without any improvement in her symptoms.  No prior colonoscopy.\par \par ; [place] ID number [id number]

## 2021-08-06 NOTE — REVIEW OF SYSTEMS
[As Noted in HPI] : as noted in HPI [Negative] : Heme/Lymph [FreeTextEntry7] : Epigastric abdominal pain and reflux

## 2021-08-06 NOTE — PHYSICAL EXAM
[Excoriation] : excoriations [Normal] : was normal [None] : there was no rectal mass  [Respiratory Effort] : normal respiratory effort [Normal Rate and Rhythm] : normal rate and rhythm [Calm] : calm [Gross Blood] : no gross blood [de-identified] : Soft, nontender, nondistended.  No mass or hernias appreciated. [de-identified] : Grade 1 internal hemorrhoids [de-identified] : Anterior external hemorrhoid skin tag [de-identified] : Well-appearing, in no distress [de-identified] : Normocephalic, atraumatic [de-identified] : Moves extremities without difficulty [de-identified] : Warm and dry [de-identified] : Alert and oriented x3

## 2021-08-06 NOTE — CONSULT LETTER
[Consult Letter:] : I had the pleasure of evaluating your patient, [unfilled]. [Please see my note below.] : Please see my note below. [Consult Closing:] : Thank you very much for allowing me to participate in the care of this patient.  If you have any questions, please do not hesitate to contact me. [Sincerely,] : Sincerely, [Dear  ___] : Dear  [unfilled], [FreeTextEntry3] : Antonio Seymour MD\par

## 2021-09-03 ENCOUNTER — APPOINTMENT (OUTPATIENT)
Dept: COLORECTAL SURGERY | Facility: CLINIC | Age: 41
End: 2021-09-03

## 2021-12-03 NOTE — ED PROVIDER NOTE - NSFOLLOWUPCLINICS_GEN_ALL_ED_FT
Time of death Vazquez Pugh RN  12/03/21 [address] [facility]  Cardiology  [address] [place] [address]  Phone: [phone]  Fax:   Follow Up Time:

## 2022-05-03 ENCOUNTER — EMERGENCY (EMERGENCY)
Facility: HOSPITAL | Age: 42
LOS: 1 days | Discharge: HOME | End: 2022-05-03
Attending: STUDENT IN AN ORGANIZED HEALTH CARE EDUCATION/TRAINING PROGRAM
Payer: COMMERCIAL

## 2022-05-03 VITALS
SYSTOLIC BLOOD PRESSURE: 107 MMHG | RESPIRATION RATE: 18 BRPM | OXYGEN SATURATION: 99 % | TEMPERATURE: 98 F | HEIGHT: 64 IN | HEART RATE: 82 BPM | DIASTOLIC BLOOD PRESSURE: 73 MMHG | WEIGHT: 141.1 LBS

## 2022-05-03 PROCEDURE — 99283 EMERGENCY DEPT VISIT LOW MDM: CPT

## 2022-05-04 PROCEDURE — 99283 EMERGENCY DEPT VISIT LOW MDM: CPT

## 2022-05-04 RX ORDER — DEXAMETHASONE 0.5 MG/5ML
10 ELIXIR ORAL ONCE
Refills: 0 | Status: COMPLETED | OUTPATIENT
Start: 2022-05-04 | End: 2022-05-04

## 2022-05-04 RX ADMIN — Medication 100 MILLIGRAM(S): at 00:38

## 2022-05-04 RX ADMIN — Medication 10 MILLIGRAM(S): at 00:38

## 2022-05-04 NOTE — ED PROVIDER NOTE - NS ED ATTENDING STATEMENT MOD
This was a shared visit with the SEKOU. I reviewed and verified the documentation and independently performed the documented:

## 2022-05-04 NOTE — ED PROVIDER NOTE - NSFOLLOWUPINSTRUCTIONS_ED_ALL_ED_FT
- Prescription sent to pharmacy.   - Ibuprofen 600mg every 6 hours as needed for pain.  - Acetaminophen 650mg every 6 hours as needed for pain.   - Over the counter lozenges.  - Warm tea with honey and ginger.  - Please call to schedule follow up appointment with your primary care physician within 24-48 hours.  - Please seek immediate medical attention for any new/worsening, signs/symptoms, or concerns.    Feels better!    - Receta enviada a farmacia.  - Ibuprofeno 600mg cada 6 horas según necesidad para el dolor.  - Acetaminofén 650 mg cada 6 horas según sea necesario para el dolor.  - Pastillas de venta kailey.  - Té caliente con miel y jengagan.  - Llame para programar boyd dandre de seguimiento con marti médico de atención primaria dentro de las 24 a 48 horas.  - Por favor, busque atención médica inmediata para cualquier nuevo/empeoramiento, signos/síntomas o inquietudes.    ¡Se siente mejor!      Enfermedades virales en los adultos    Viral Illness, Adult      Los virus son microbios diminutos que entran en el organismo de boyd persona y causan enfermedades. Hay muchos tipos de virus diferentes y causan muchas clases de enfermedades. Las enfermedades virales pueden ser leves o graves. Pueden afectar diferentes partes del cuerpo.    Entre las afecciones a corto plazo causadas por virus, se incluyen los resfríos y la gripe. Entre las afecciones a austin plazo causadas por un virus, incluyen el herpes, la culebrilla y la infección por VIH (virus de inmunodeficiencia humana). Se qiu identificado unos pocos virus asociados con determinados tipos de cáncer.      ¿Cuáles son las causas?    Muchos tipos de virus pueden causar enfermedades. Los virus invaden las células del organismo, se multiplican y provocan que las células infectadas funcionen de manera anormal o mueran. Cuando estas células mueren, liberan más virus. Cuando esto ocurre, aparecen síntomas de la enfermedad, y el virus sigue diseminándose a otras células. Si el virus asume la función de la célula, puede hacer que esta se divida y prolifere de manera descontrolada. [employer] ocurre cuando un virus causa cáncer.    Los diferentes virus ingresan al organismo de distintas formas. Puede contraer un virus de la siguiente manera:  •Al ingerir alimentos o beber agua que qiu entrado en contacto con el virus (están contaminados).      •Al inhalar gotitas que boyd persona infectada liberó en el aire al toser o estornudar.      •Al tocar boyd superficie contaminada con el virus y luego llevarse la mano a la boca, la nariz o los ojos.      •Al ser jamar por un insecto o mordido por un animal que son portadores del virus.      •Al tener contacto sexual con boyd persona infectada por el virus.      •Al tener contacto con guerline o líquidos que contienen el virus, ya sea a través de un mando abierto o willard boyd transfusión.      Si el virus ingresa al organismo, el sistema de defensa del cuerpo (sistema inmunitario) intentará combatirlo. Puede correr un riesgo más alto de tener boyd enfermedad viral si tiene el sistema inmunitario debilitado.      ¿Cuáles son los signos o síntomas?    Puede tener los siguientes síntomas, dependiendo del tipo de virus y de la ubicación de las células que invade:•Virus del resfrío y de la gripe:  •Fiebre.      •Dolor de april.      •Dolor de garganta.      •Roxana musculares.      •Nariz tapada (congestión nasal).      •Tos.      •Virus del aparato digestivo (gastrointestinales):  •Fiebre.      •Dolor en el abdomen.      •Náuseas.      •Diarrea.      •Virus hepáticos (hepatitis):  •Pérdida del apetito.      •Cansancio.      •La piel o las partes lorraine de los ojos se ponen dioni (ictericia).      •Virus del cerebro y la médula saldivar:  •Fiebre.      •Dolor de april.      •Rigidez en el shea.      •Náuseas y vómitos.      •Confusión o somnolencia.      •Virus de la piel:  •Verrugas.      •Picazón.      •Erupción cutánea.      •Virus de transmisión sexual:  •Secreción.      •Hinchazón.      •Enrojecimiento.      •Erupción cutánea.          ¿Cómo se diagnostica?    Esta afección se puede diagnosticar en función de lo siguiente:  •Síntomas.      •Antecedentes médicos.      •Examen físico.      •Análisis de guerline, boyd muestra de mucosidad de los pulmones (muestra de esputo), muestra de heces o un hisopado de líquidos corporales o boyd llaga de la piel (lesión).        ¿Cómo se trata?    Los virus pueden ser difíciles de tratar porque se hospedan en las células. Los antibióticos no tratan los virus porque estos medicamentos no llegan al interior de las células. El tratamiento de boyd enfermedad viral puede incluir lo siguiente:  •Descansar y beber abundantes líquidos.      •Medicamentos para aliviar los síntomas. Estos pueden incluir medicamentos de venta kailey para el dolor y la fiebre, medicamentos para la tos o la congestión y medicamentos para aliviar la diarrea.      •Medicamentos antivirales. Estos medicamentos están disponibles únicamente para ciertos tipos de virus.      Algunas enfermedades virales pueden evitarse con vacunas. Un ejemplo frecuente es la vacuna antigripal.      Siga estas instrucciones en marti casa:    Medicamentos     •Use los medicamentos de venta kailey y los recetados solamente sky se lo haya indicado el médico.      •Si le recetaron un medicamento antiviral, tómelo sky se lo haya indicado el médico. No deje de charles el antiviral aunque comience a sentirse mejor.    •Infórmese sobre cuándo los antibióticos son necesarios y cuándo no. Los antibióticos no combaten a los virus. Si tiene boyd infección viral y el médico taj que también tiene boyd infección bacteriana, o que está en riesgo de contraerla, melani vez le recete un antibiótico.  •No solicite boyd receta para antibióticos si le qiu diagnosticado boyd enfermedad viral. Los antibióticos no harán que se cure más rápidamente.      •Charles antibióticos con frecuencia cuando no son necesarios puede derivar en resistencia a los antibióticos. Cuando esto ocurre, el medicamento pierde marti eficacia contra las bacterias que normalmente combate.          Indicaciones generales      •Talisha suficiente líquido para mantener la orina de color amarillo pálido.      •Descanse todo lo que pueda.      •Retome amalia actividades normales según lo indicado por el médico. Pregúntele al médico qué actividades son seguras para usted.      •Concurra a todas las visitas de seguimiento sky se lo haya indicado el médico. [employer] es importante.        ¿Cómo se previene?     Para reducir el riesgo de tener boyd enfermedad viral:  •Lávese las tate frecuentemente con agua y jabón willard al menos 20 segundos. Use desinfectante para tate si no dispone de agua y jabón.      •Evite tocarse la nariz, los ojos y la boca, sobre todo si no se lavó las tate recientemente.      •Si un miembro de la yusuf tiene boyd infección viral, limpie todas las superficies de la casa que puedan vidal estado en contacto con el virus. Use [place] y jabón. También puede usar lejía con agua agregada (diluido).      •Manténgase lejos de las personas enfermas con síntomas de boyd infección viral.      •No comparta objetos tales sky cepillos de dientes y botellas de agua con otras personas.      •Mantenga las vacunas al día. [employer] incluye recibir la vacuna antigripal todos los años.      •Siga boyd dieta saludable y descanse lo suficiente.        Comuníquese con un médico si:    •Tiene síntomas de boyd enfermedad viral que no desaparecen.      •Los síntomas regresan después de vidal desaparecido.      •Amalia síntomas empeoran.        Solicite ayuda de inmediato si tiene:    •Dificultad para respirar.      •Dolor de april intenso o rigidez en el hsea.      •Vómitos abundantes o dolor en el abdomen.      Estos síntomas pueden representar un problema grave que constituye boyd emergencia. No espere a pepe si los síntomas desaparecen. Solicite atención médica de inmediato. Comuníquese con el servicio de emergencias de marti localidad (911 en los [place]). No conduzca por amalia propios medios hasta el hospital.       Resumen    •Los virus son tipos de microbios que entran en el organismo de boyd persona y causan enfermedades. Las enfermedades virales pueden ser leves o graves. Pueden afectar diferentes partes del cuerpo.      •Los virus pueden ser difíciles de tratar. Hay medicamentos para aliviar los síntomas y hay algunos medicamentos antivirales.      •Si le recetaron un medicamento antiviral, tómelo sky se lo haya indicado el médico. No deje de charles el antiviral aunque comience a sentirse mejor.      •Comuníquese con un médico si tiene síntomas de boyd enfermedad viral que no desaparecen.      Esta información no tiene sky fin reemplazar el consejo del médico. Asegúrese de hacerle al médico cualquier pregunta que tenga.

## 2022-05-04 NOTE — ED PROVIDER NOTE - OBJECTIVE STATEMENT
42 yo female PMHx anemia presents to ED c/o sore throat x5 days. Associated with cough and hoarse voice. Eating/drinking normally. Did not self medicate PTA. No further complaints at this time.  Denies fevers.

## 2022-05-04 NOTE — ED PROVIDER NOTE - PHYSICAL EXAMINATION
General: Non-toxic appearing; well nourished/developed. +Hoarse voice.   Skin: No rashes or lesions. Warm, dry, color normal for race.   Eyes: Sclera anicteric, conjunctivae clear b/l. No discharge. PERRLA, EOMI.  Throat: Airway patent. Tolerating secretions, no drooling or trismus. Moist mucus membranes. Tonsils and pharynx without erythema or exudates. Tonsils not enlarged. No unilateral enlargement. Uvula midline, no edema or swelling, rises symmetrically.  Neck: Supple, FROM.   Cardio: Rate and rhythm regular. No audible murmur.  Resp: Breath sounds vesicular, symmetrical and without rales, rhonchi or wheezing b/l.  MSK: MAEx4. FROM.  Neuro: A&Ox3. No motor/sensory deficits.

## 2022-05-04 NOTE — ED PROVIDER NOTE - CLINICAL SUMMARY MEDICAL DECISION MAKING FREE TEXT BOX
42 yo female PMHx anemia presents to ED with viral syndrome. +Hoarse voice. Nontoxic, well appearing, afebrile. Symptom control. Patient to be discharged. Patient and/or guardian provided verbal/written discharge instructions and return precautions. Patient and/or guardian expresses understanding and agreement.

## 2022-05-04 NOTE — ED PROVIDER NOTE - PATIENT PORTAL LINK FT
You can access the FollowMyHealth Patient Portal offered by [facility] by registering at the following website: [email or web address]. By joining [employer]’s FollowMyHealth portal, you will also be able to view your health information using other applications (apps) compatible with our system.

## 2022-05-04 NOTE — ED ADULT NURSE NOTE - OBJECTIVE STATEMENT
Pt. c/o cough, congestion, HA for 4-5 days.  Denies fever, sick contacts.  Hx. of Anemia, covid in Jan.

## 2022-10-06 NOTE — ED ADULT NURSE NOTE - NS ED NURSE RECORD ANOTHER HT AND WT
LABS:                        10.7   17.77 )-----------( 317      ( 06 Oct 2022 11:00 )             33.1     10    143  |  111<H>  |  45<H>  ----------------------------<  95  3.8   |  22  |  0.64    Ca    9.2      06 Oct 2022 11:00    TPro  7.0  /  Alb  2.9<L>  /  TBili  0.4  /  DBili  x   /  AST  41<H>  /  ALT  25  /  AlkPhos  86  10    PT/INR - ( 06 Oct 2022 11:00 )   PT: 13.9 sec;   INR: 1.17 ratio         PTT - ( 06 Oct 2022 11:00 )  PTT:32.5 sec  Urinalysis Basic - ( 06 Oct 2022 12:45 )    Color: Yellow / Appearance: Clear / S.010 / pH: x  Gluc: x / Ketone: Negative  / Bili: Negative / Urobili: Negative   Blood: x / Protein: Negative / Nitrite: Negative   Leuk Esterase: Small / RBC: 0-2 /HPF / WBC 0-2 /HPF   Sq Epi: x / Non Sq Epi: Negative /HPF / Bacteria: Negative /HPF      LIVER FUNCTIONS - ( 06 Oct 2022 11:00 )  Alb: 2.9 g/dL / Pro: 7.0 g/dL / ALK PHOS: 86 U/L / ALT: 25 U/L DA / AST: 41 U/L / GGT: x           Lactate, Blood: 1.7 mmol/L (10-06-22 @ 11:00) Yes

## 2022-11-03 NOTE — ED PROVIDER NOTE - NS ED MD DISPO DISCHARGE
[facility] pt care at this time   1535 Bedside and Verbal shift change report given to ROEL Da Silva RN (oncoming nurse) by Kim Mo RN (offgoing nurse). Report included the following information SBAR, Kardex, Procedure Summary, Intake/Output, MAR, Recent Results, and Med Rec Status. Home

## 2022-12-21 ENCOUNTER — APPOINTMENT (OUTPATIENT)
Dept: COLORECTAL SURGERY | Facility: CLINIC | Age: 42
End: 2022-12-21

## 2023-12-05 NOTE — OB PROVIDER H&P - PRO FEEDING PLAN INFANT OB
Quality 226: Preventive Care And Screening: Tobacco Use: Screening And Cessation Intervention: Patient screened for tobacco use, is a smoker AND received Cessation Counseling within measurement period or in the six months prior to the measurement period Quality 130: Documentation Of Current Medications In The Medical Record: Current Medications Documented Detail Level: Detailed Quality 47: Advance Care Plan: Advance Care Planning discussed and documented in the medical record; patient did not wish or was not able to name a surrogate decision maker or provide an advance care plan. Quality 110: Preventive Care And Screening: Influenza Immunization: Influenza Immunization Administered during Influenza season breastfeeding exclusively

## 2024-12-07 ENCOUNTER — EMERGENCY (EMERGENCY)
Facility: HOSPITAL | Age: 44
LOS: 1 days | Discharge: HOME | End: 2024-12-07
Attending: STUDENT IN AN ORGANIZED HEALTH CARE EDUCATION/TRAINING PROGRAM
Payer: COMMERCIAL

## 2024-12-07 VITALS
HEART RATE: 104 BPM | SYSTOLIC BLOOD PRESSURE: 121 MMHG | WEIGHT: 144.4 LBS | TEMPERATURE: 98 F | RESPIRATION RATE: 16 BRPM | DIASTOLIC BLOOD PRESSURE: 71 MMHG | OXYGEN SATURATION: 98 %

## 2024-12-07 LAB
APPEARANCE UR: CLEAR — SIGNIFICANT CHANGE UP
BACTERIA # UR AUTO: ABNORMAL /HPF
BILIRUB UR-MCNC: NEGATIVE — SIGNIFICANT CHANGE UP
CAST: 1 /LPF — SIGNIFICANT CHANGE UP (ref 0–4)
COLOR SPEC: YELLOW — SIGNIFICANT CHANGE UP
DIFF PNL FLD: NEGATIVE — SIGNIFICANT CHANGE UP
GLUCOSE UR QL: NEGATIVE MG/DL — SIGNIFICANT CHANGE UP
KETONES UR-MCNC: ABNORMAL MG/DL
LEUKOCYTE ESTERASE UR-ACNC: ABNORMAL
NITRITE UR-MCNC: POSITIVE
PH UR: 6 — SIGNIFICANT CHANGE UP (ref 5–8)
PROT UR-MCNC: NEGATIVE MG/DL — SIGNIFICANT CHANGE UP
RBC CASTS # UR COMP ASSIST: 1 /HPF — SIGNIFICANT CHANGE UP (ref 0–4)
SP GR SPEC: 1.02 — SIGNIFICANT CHANGE UP (ref 1–1.03)
SQUAMOUS # UR AUTO: 10 /HPF — HIGH (ref 0–5)
UROBILINOGEN FLD QL: 1 MG/DL — SIGNIFICANT CHANGE UP (ref 0.2–1)
WBC UR QL: 4 /HPF — SIGNIFICANT CHANGE UP (ref 0–5)

## 2024-12-07 PROCEDURE — 87186 SC STD MICRODIL/AGAR DIL: CPT

## 2024-12-07 PROCEDURE — 99284 EMERGENCY DEPT VISIT MOD MDM: CPT

## 2024-12-07 PROCEDURE — 71046 X-RAY EXAM CHEST 2 VIEWS: CPT

## 2024-12-07 PROCEDURE — 87086 URINE CULTURE/COLONY COUNT: CPT

## 2024-12-07 PROCEDURE — 81001 URINALYSIS AUTO W/SCOPE: CPT

## 2024-12-07 PROCEDURE — 71046 X-RAY EXAM CHEST 2 VIEWS: CPT | Mod: 26

## 2024-12-07 PROCEDURE — T1013: CPT

## 2024-12-07 RX ORDER — IBUPROFEN 200 MG
1 TABLET ORAL
Qty: 20 | Refills: 0
Start: 2024-12-07 | End: 2024-12-11

## 2024-12-07 RX ORDER — CEPHALEXIN 250 MG/1
1 CAPSULE ORAL
Qty: 21 | Refills: 0
Start: 2024-12-07 | End: 2024-12-13

## 2024-12-07 RX ORDER — CEPHALEXIN 250 MG/1
500 CAPSULE ORAL EVERY 12 HOURS
Refills: 0 | Status: DISCONTINUED | OUTPATIENT
Start: 2024-12-07 | End: 2024-12-15

## 2024-12-07 RX ORDER — IBUPROFEN 200 MG
600 TABLET ORAL ONCE
Refills: 0 | Status: COMPLETED | OUTPATIENT
Start: 2024-12-07 | End: 2024-12-07

## 2024-12-07 RX ADMIN — CEPHALEXIN 500 MILLIGRAM(S): 250 CAPSULE ORAL at 23:57

## 2024-12-07 RX ADMIN — Medication 600 MILLIGRAM(S): at 22:19

## 2024-12-08 VITALS
HEART RATE: 88 BPM | TEMPERATURE: 98 F | SYSTOLIC BLOOD PRESSURE: 105 MMHG | OXYGEN SATURATION: 98 % | RESPIRATION RATE: 18 BRPM | DIASTOLIC BLOOD PRESSURE: 65 MMHG

## 2024-12-09 RX ORDER — DOXYCYCLINE HYCLATE 100 MG
1 TABLET ORAL
Qty: 10 | Refills: 0
Start: 2024-12-09 | End: 2024-12-13

## 2024-12-09 RX ORDER — CEFPODOXIME PROXETIL 200 MG/1
1 TABLET, FILM COATED ORAL
Qty: 10 | Refills: 0
Start: 2024-12-09 | End: 2024-12-13
